# Patient Record
(demographics unavailable — no encounter records)

---

## 2024-10-14 NOTE — COUNSELING
[Nutrition/ Exercise/ Weight Management] : nutrition, exercise, weight management [Vitamins/Supplements] : vitamins/supplements [Breast Self Exam] : breast self exam [Contraception/ Emergency Contraception/ Safe Sexual Practices] : contraception, emergency contraception, safe sexual practices [Preconception Care/ Fertility options] : preconception care, fertility options [FreeTextEntry2] : PCOS

## 2024-10-14 NOTE — PHYSICAL EXAM
[Appropriately responsive] : appropriately responsive [Alert] : alert [No Acute Distress] : no acute distress [Regular Rate Rhythm] : regular rate rhythm [Soft] : soft [Non-tender] : non-tender [Non-distended] : non-distended [No Lesions] : no lesions [No Mass] : no mass [Oriented x3] : oriented x3 [Examination Of The Breasts] : a normal appearance [Normal] : normal [No Masses] : no breast masses were palpable [FreeTextEntry5] : respirations even and unlabored, no dyspnea  [FreeTextEntry1] : pelvic exam deferred

## 2024-10-14 NOTE — DISCUSSION/SUMMARY
[FreeTextEntry1] : Well appearing female is here for BCM refill, hx of PCOS, uses Nuva Ring for period regulation and is happy with current treatment. She is concerned about future fertility d/t hx of PCOS.  PCOS; - had a detailed discussion with patient regarding PCOS --> overall health implications and fertility concerns - Encouraged healthy low carb diet/exercise, patient hand out provided   BCM: - UTD with PAP - Patient declines STI testing today (pt has one long term male partner)  - Rx refill for Nuva Ring sent; instructions and precautions given. - Safe sex/condom use recommended   RTO in 6 months for Annual GYN exam and as needed.

## 2024-10-14 NOTE — HISTORY OF PRESENT ILLNESS
[Patient reported PAP Smear was normal] : Patient reported PAP Smear was normal [Intravaginal Ring] : uses the intravaginal ring [Monogamous (Male Partner)] : is monogamous with a male partner [Y] : Patient uses contraception [Condoms] : uses condoms [FreeTextEntry1] : 28-year-old female is here as a new patient to me for Nuva Ring refill; patient reports longstanding history of irregular periods and PCOS, for which she is currently using Nuva Ring for period regulation. Patient states she is concerned about getting pregnant, she may want to start trying to get pregnant in the near future. Patient admits to being sexually active with one long term boyfriend. Reports history of genital herpes, has not had an outbreak in "a long time". Denies any pelvic or breast complaints.  [PapSmeardate] : 03/24 [TextBox_102] : Hx of irregular periods [LMPDate] : 09/20/24

## 2024-10-28 NOTE — HEALTH RISK ASSESSMENT
[Yes] : Yes [Monthly or less (1 pt)] : Monthly or less (1 point) [1 or 2 (0 pts)] : 1 or 2 (0 points) [Never (0 pts)] : Never (0 points) [No falls in past year] : Patient reported no falls in the past year [1] : 2) Feeling down, depressed, or hopeless for several days (1) [PHQ-2 Negative - No further assessment needed] : PHQ-2 Negative - No further assessment needed [Never] : Never [Patient reported PAP Smear was normal] : Patient reported PAP Smear was normal [# of Members in Household ___] :  household currently consist of [unfilled] member(s) [Employed] : employed [# Of Children ___] : has [unfilled] children [Fully functional (bathing, dressing, toileting, transferring, walking, feeding)] : Fully functional (bathing, dressing, toileting, transferring, walking, feeding) [Fully functional (using the telephone, shopping, preparing meals, housekeeping, doing laundry, using] : Fully functional and needs no help or supervision to perform IADLs (using the telephone, shopping, preparing meals, housekeeping, doing laundry, using transportation, managing medications and managing finances) [Smoke Detector] : smoke detector [Designated Healthcare Proxy] : Designated healthcare proxy [Name: ___] : Health Care Proxy's Name: [unfilled]  [Relationship: ___] : Relationship: [unfilled]

## 2024-10-28 NOTE — HISTORY OF PRESENT ILLNESS
[Patient reported PAP Smear was normal] : Patient reported PAP Smear was normal [Condoms] : uses condoms [Intravaginal Ring] : uses the intravaginal ring [Y] : Patient is sexually active [Monogamous (Male Partner)] : is monogamous with a male partner [FreeTextEntry1] : 28-year-old female is here for acute left sided pelvic pain, started on the first day of her period 10/18/24, said she had trouble sleeping and lying on the left side of her body, reports some improvement in the pain when she woke up the next day. She states pain has resolved at this time, but she is concerned something could be wrong, she denies N/V/D/C, dysuria or hematuria. She has not been sexually active since last visit. She has not been on her BCM (Nuvaring) in a few months because she ran out, new script was sent to the pharmacy last visit but she has not yet picked it up.  [PapSmeardate] : 03/24 [TextBox_102] : Hx of irregular periods [LMPDate] : 09/20/24

## 2024-10-28 NOTE — DISCUSSION/SUMMARY
[FreeTextEntry1] : Well appearing female is here for c/o acute onset of left pelvic pain that started during her last period. She has not been on Nuva Ring in a few months, denies recently being sexually active, has one male partner. no other complaints. +pelvic distension on exam, no ttp/guarding. pt requests STI testing as well UA is negative.   - Vaginal cx obtained - rx for pelvic ultrasound provided - potential causes of pelvic pain discussed (ovarian cyst /gyn causes vs GI). Pain management reviewed - ER warnings given.  - Recommend patient restart Nuva ring BCM, should be available at her pharmacy for .  - All questions answered  RTO as directed and PRN

## 2024-10-28 NOTE — PHYSICAL EXAM
[Appropriately responsive] : appropriately responsive [Alert] : alert [No Acute Distress] : no acute distress [Regular Rate Rhythm] : regular rate rhythm [Soft] : soft [Non-tender] : non-tender [Non-distended] : non-distended [No Lesions] : no lesions [No Mass] : no mass [Oriented x3] : oriented x3 [FreeTextEntry5] : respirations even and unlabored, no dyspnea  [FreeTextEntry1] : Labia/Clitoris: Normal, no lesions. Vagina: Normal, no lesions visible or palpable. no abnormal discharge Cervix: Smooth, pink, no lesions. No cervical motion tenderness. Uterus: normal size and position mobile, nontender.  Adnexa: + left pelvic distension, but no discrete palpable adnexal masses, nontender bilaterally.

## 2024-10-28 NOTE — PHYSICAL EXAM
[No Edema] : there was no peripheral edema [Normal Supraclavicular Nodes] : no supraclavicular lymphadenopathy [Normal Anterior Cervical Nodes] : no anterior cervical lymphadenopathy [Normal] : no joint swelling and grossly normal strength and tone [No Focal Deficits] : no focal deficits [Normal Gait] : normal gait [Speech Grossly Normal] : speech grossly normal [Normal Affect] : the affect was normal [Normal Mood] : the mood was normal

## 2025-02-23 NOTE — HISTORY OF PRESENT ILLNESS
[FreeTextEntry8] : ARNOLDO WILSON is a 29 year F who presents today to f/u after being diagnosed with Flu A on 2/9/25. She is still feeling fatigued. She also has cough that persists. She wanted to make sure that there is no residual pulmonary problems because of the flu. She insists on some lab work ie a CBC. . She also wants to check her lipids [id number] were somewhat elevated at last physical.  She also reports a change in her anti-depressant therapy. is taking now taking Zoloft- changed from Lexapro. The Lexpro wasn't as effective lately.

## 2025-02-23 NOTE — HISTORY OF PRESENT ILLNESS
[FreeTextEntry8] : ARNOLDO WILSON is a 29 year F who presents today to f/u after being diagnosed with Flu A on 2/9/25. She is still feeling fatigued. She also has cough that persists. She wanted to make sure that there is no residual pulmonary problems because of the flu. She insists on some lab work ie a CBC. . She also wants to check her lipids [id number] were somewhat elevated at last physical.  She also reports a change in her anti-depressant therapy. is taking now taking Zoloft- changed from Lexapro. The Lexpro wasn't as effective lately.

## 2025-02-23 NOTE — PHYSICAL EXAM
[Normal Sclera/Conjunctiva] : normal sclera/conjunctiva [Normal Outer Ear/Nose] : the outer ears and nose were normal in appearance [Normal] : no respiratory distress, lungs were clear to auscultation bilaterally and no accessory muscle use [Normal Rate] : normal rate  [Regular Rhythm] : with a regular rhythm [Normal S1, S2] : normal S1 and S2 [Alert and Oriented x3] : oriented to person, place, and time

## 2025-04-14 NOTE — PHYSICAL EXAM
[Appropriately responsive] : appropriately responsive [Alert] : alert [No Acute Distress] : no acute distress [Regular Rate Rhythm] : regular rate rhythm [Soft] : soft [Non-tender] : non-tender [Non-distended] : non-distended [No Lesions] : no lesions [No Mass] : no mass [Oriented x3] : oriented x3 [Labia Majora] : normal [Labia Minora] : normal [Normal] : normal [Uterine Adnexae] : normal [FreeTextEntry5] : PAP collected

## 2025-04-14 NOTE — DISCUSSION/SUMMARY
[FreeTextEntry1] : Well appearing female is here for GYN exam. doing well on Nuvaring, desires refill.    - PAP done with NG/GC - Rx refill for NuvaRing sent; instructions & precautions reviewed - Healthy diet and routine exercise recommended - Self breast awareness advised - Safe sex/condom use recommended   RTO in as directed for gyn exam and PRN

## 2025-04-14 NOTE — HISTORY OF PRESENT ILLNESS
[Patient reported PAP Smear was normal] : Patient reported PAP Smear was normal [Condoms] : uses condoms [Intravaginal Ring] : uses the intravaginal ring [Y] : Patient is sexually active [Monogamous (Male Partner)] : is monogamous with a male partner [FreeTextEntry1] : 29-year-old female is here for a routine gynecological exam; states she is doing well on current BCM (NuvaRing), periods are well regulated. States she is still not sure if she wants to get pregnant in the next year or not, wants to wait until she is , lives with her boyfriend currently. She offers no acute gyn complaints. [PapSmeardate] : 03/24 [TextBox_102] : Hx of irregular periods [LMPDate] : 03/22/25 [PGHxTotal] : 0

## 2025-04-29 NOTE — ASSESSMENT
[FreeTextEntry1] : Eczema;  ? contact; mometasone ointment BID x 1-2 wks aquaphor maintenance    Therapeutic options and their risks and benefits; along with multiple diagnostic possibilities were discussed at length; risks and benefits of further study were discussed;  pt. speculating that shoes may play a role;  The patient will consider patch testing if this condition fails to respond to treatment.

## 2025-04-29 NOTE — PHYSICAL EXAM
[FreeTextEntry3] : Erythematous scaling patches with inflammation  left dorsal foot, extending to great toe; some on dorsal toes  similar, less extensive changes on right;

## 2025-04-29 NOTE — HISTORY OF PRESENT ILLNESS
[de-identified] : Pt c/o rashes on feet, itchy, x few months;  used OTC antifungal creams without benefit